# Patient Record
Sex: MALE | Race: BLACK OR AFRICAN AMERICAN | Employment: UNEMPLOYED | URBAN - METROPOLITAN AREA
[De-identification: names, ages, dates, MRNs, and addresses within clinical notes are randomized per-mention and may not be internally consistent; named-entity substitution may affect disease eponyms.]

---

## 2023-03-01 ENCOUNTER — HOSPITAL ENCOUNTER (EMERGENCY)
Age: 33
Discharge: HOME | End: 2023-03-01
Attending: EMERGENCY MEDICINE
Payer: COMMERCIAL

## 2023-03-01 VITALS
SYSTOLIC BLOOD PRESSURE: 150 MMHG | TEMPERATURE: 98 F | HEART RATE: 70 BPM | RESPIRATION RATE: 18 BRPM | DIASTOLIC BLOOD PRESSURE: 90 MMHG | HEIGHT: 71 IN | OXYGEN SATURATION: 98 %

## 2023-03-01 DIAGNOSIS — S61.011A LACERATION OF RIGHT THUMB WITHOUT FOREIGN BODY, NAIL DAMAGE STATUS UNSPECIFIED, INITIAL ENCOUNTER: Primary | ICD-10-CM

## 2023-03-01 PROCEDURE — 99283 EMERGENCY DEPT VISIT LOW MDM: CPT

## 2023-03-01 ASSESSMENT — PAIN - FUNCTIONAL ASSESSMENT: PAIN_FUNCTIONAL_ASSESSMENT: NONE - DENIES PAIN

## 2023-03-01 NOTE — ED PROVIDER NOTES
[address] ENCOUNTER        Pt Name: Francisco Mueller  MRN: [id number]  [place] 1990  Date of evaluation: 3/1/2023  Provider: Karrie Marcos MD  PCP: No primary care provider on file. Note Started: 4:22 AM EST 3/1/23    CHIEF COMPLAINT       Chief Complaint   Patient presents with    Laceration       HISTORY OF PRESENT ILLNESS: 1 or more Elements   History From: Patient        Francisco Mueller is a 28 y.o. male who presents for evaluation of laceration to the right hand. Patient reports he was opening a window and cut the middle section of the thenar eminence of the right hand. Is a small avulsion. Reports low suspicion for foreign body. Patient is in please custody was brought in for clearance. He denies pain with movements of the thumb and denies loss of sensation or motor function. He states his tetanus is up-to-date. He denies additional injury. Nursing Notes were all reviewed and agreed with or any disagreements were addressed in the HPI. REVIEW OF SYSTEMS :      Review of Systems    Positives and Pertinent negatives as per HPI. SURGICAL HISTORY   No past surgical history on file. CURRENTMEDICATIONS     There are no discharge medications for this patient. ALLERGIES     Patient has no known allergies. FAMILYHISTORY     No family history on file.      SOCIAL HISTORY          SCREENINGS        Jayla Coma Scale  Eye Opening: Spontaneous  Best Verbal Response: Oriented  Best Motor Response: Obeys commands  Jayla Coma Scale Score: 15                CIWA Assessment  BP: (!) 150/90  Heart Rate: 70           PHYSICAL EXAM  1 or more Elements     ED Triage Vitals   BP Temp Temp Source Heart Rate Resp SpO2 Height Weight   03/01/23 0252 03/01/23 0252 03/01/23 0252 03/01/23 0252 03/01/23 0252 03/01/23 0252 03/01/23 0255 --   (!) 150/90 98 °F (36.7 °C) Oral 70 18 98 % 5' 11\" (1.803 m)        Physical Exam  Constitutional:       Appearance: Normal appearance. Musculoskeletal:         General: Signs of injury present. No swelling or tenderness. Skin:     Capillary Refill: Capillary refill takes less than 2 seconds. Findings: Lesion present. Neurological:      General: No focal deficit present. Mental Status: He is alert. Sensory: No sensory deficit. Motor: No weakness. DIAGNOSTIC RESULTS   LABS:    Labs Reviewed - No data to display    When ordered only abnormal lab results are displayed. All other labs were within normal range or not returned as of this dictation. EKG:     RADIOLOGY:   Non-plain film images such as CT, Ultrasound and MRI are read by the radiologist. Plain radiographic images are visualized and preliminarily interpreted by the ED Provider with the below findings:      Interpretation per the Radiologist below, if available at the time of this note:    Discussed with Radiologist:     No orders to display     No results found. No results found. PROCEDURES   Unless otherwise noted below, none     Procedures    CRITICAL CARE TIME (.cct)       PAST MEDICAL HISTORY      has no past medical history on file. EMERGENCY DEPARTMENT COURSE and DIFFERENTIAL DIAGNOSIS/MDM:   Vitals:    Vitals:    03/01/23 0252 03/01/23 0255   BP: (!) 150/90    Pulse: 70    Resp: 18    Temp: 98 °F (36.7 °C)    TempSrc: Oral    SpO2: 98%    Height:  5' 11\" (1.803 m)       Patient was given the following medications:  Medications - No data to display          Is this patient to be included in the SEP-1 Core Measure due to severe sepsis or septic shock? No   Exclusion criteria - the patient is NOT to be included for SEP-1 Core Measure due to:   Infection is not suspected    CC/HPI Summary, DDx, ED Course, and Reassessment: Differential diagnosis: Tendon laceration, neurologic injury, vascular injury, involvement of bone that could lead to osteomyelitis, retained foreign body, delayed bacterial skin infection, other    60-year-old male presents ED for evaluation of laceration. Small avulsion with controlled bleeding. Superficial wound. Cannot palpate any foreign body. Patient minimal to discharge home without x-ray. CONSULTS: (Who and What was discussed)  None          Chronic Conditions:     Records Reviewed (External and source):     Disposition Considerations (include 1 Tests not done, Admit vs D/C, Shared Decision Making, Pt Expectation of Test or Tx.): Consider obtaining imaging studies but entirety of the wound was visualized and palpated without signs of foreign body. Admission to the hospital considered but patient's symptoms are improving. Vital signs and testing performed is reassuring. Based on this patient is appropriate for outpatient management. No indication for admission at this time. Symptomatic treatment with expectant management discussed with the patient and/or family member or surrogates present and they are amenable to treatment plan and outpatient follow-up. Strict return precautions were discussed with the patient and those present. All parties involved were informed that condition may persist or worsen in which case they may then require inpatient treatment, currently there is no indication. They demonstrated understanding of when to return to the emergency department for new or worsening symptoms. I am the Primary Clinician of Record. FINAL IMPRESSION      1. Laceration of right thumb without foreign body, nail damage status unspecified, initial encounter          DISPOSITION/PLAN     DISPOSITION Decision To Discharge 03/01/2023 03:01:11 AM      PATIENT REFERRED TO:  [facility]) Pre-Services  [phone]          DISCHARGE MEDICATIONS:  There are no discharge medications for this patient. DISCONTINUED MEDICATIONS:  There are no discharge medications for this patient.              (Please note that portions of this note were completed with a voice recognition program.  Efforts were made to edit the dictations but occasionally words are mis-transcribed.)    Mal Alexander MD (electronically signed)           Mal Alexander MD  03/01/23 0193

## 2023-03-01 NOTE — ED NOTES
Pt discharged back home, reviewed discharged instructions with pt. Follow up care and return precautions discussed , pt verbalized understanding.  Pt ambulated out of the department with steady gait,Left in police custody     Rynepat Vizcarra, [address] [facility]  03/01/23 5411

## 2023-03-01 NOTE — ED NOTES
Pt presents to ED with an officer  After  Per pt \" sliding an already broken window.  Pt has a multiple small cuts to his right hand which he was concerned has some glass imbedded it thus presenting to ED for Evaluation     Ryanne Pitts RN  03/01/23 0649